# Patient Record
Sex: FEMALE | Race: OTHER | NOT HISPANIC OR LATINO | URBAN - METROPOLITAN AREA
[De-identification: names, ages, dates, MRNs, and addresses within clinical notes are randomized per-mention and may not be internally consistent; named-entity substitution may affect disease eponyms.]

---

## 2018-03-14 ENCOUNTER — EMERGENCY (EMERGENCY)
Facility: HOSPITAL | Age: 38
LOS: 1 days | Discharge: HOME | End: 2018-03-14
Attending: EMERGENCY MEDICINE
Payer: MEDICAID

## 2018-03-14 VITALS
SYSTOLIC BLOOD PRESSURE: 123 MMHG | TEMPERATURE: 98 F | OXYGEN SATURATION: 99 % | RESPIRATION RATE: 18 BRPM | HEART RATE: 71 BPM | DIASTOLIC BLOOD PRESSURE: 77 MMHG

## 2018-03-14 VITALS
HEART RATE: 69 BPM | DIASTOLIC BLOOD PRESSURE: 66 MMHG | RESPIRATION RATE: 19 BRPM | SYSTOLIC BLOOD PRESSURE: 124 MMHG | OXYGEN SATURATION: 99 %

## 2018-03-14 DIAGNOSIS — Z98.890 OTHER SPECIFIED POSTPROCEDURAL STATES: Chronic | ICD-10-CM

## 2018-03-14 PROCEDURE — 64400 NJX AA&/STRD TRIGEMINAL NRV: CPT | Mod: 26

## 2018-03-14 PROCEDURE — 99283 EMERGENCY DEPT VISIT LOW MDM: CPT | Mod: 25

## 2018-03-14 PROCEDURE — 64400 NJX AA&/STRD TRIGEMINAL NRV: CPT

## 2018-03-14 PROCEDURE — 99283 EMERGENCY DEPT VISIT LOW MDM: CPT

## 2018-03-14 RX ORDER — PENICILLIN V POTASSIUM 250 MG
500 TABLET ORAL ONCE
Qty: 0 | Refills: 0 | Status: COMPLETED | OUTPATIENT
Start: 2018-03-14 | End: 2018-03-14

## 2018-03-14 RX ORDER — PENICILLIN V POTASSIUM 250 MG
1 TABLET ORAL
Qty: 30 | Refills: 0 | OUTPATIENT
Start: 2018-03-14 | End: 2018-03-23

## 2018-03-14 RX ADMIN — Medication 500 MILLIGRAM(S): at 15:58

## 2018-03-14 NOTE — ED PROVIDER NOTE - ATTENDING CONTRIBUTION TO CARE
pt with dental pain  PE: Gen: WNL nontoxic, well apparing,  CV/LUNG: WNL  OP:  Dental tenderness bt tooth 3-4. possible early abscess vs open socket.    A/P;  dental pain.   meds/abx and tentative dc with close dental f/u.

## 2018-03-14 NOTE — ED ADULT NURSE NOTE - CAS TRG GEN SKIN COLOR
Hydrocodone- Acetaminophe 5-325      Last Written Prescription Date:  05/03/2017  Last Fill Quantity: 20,   # refills: 0  Last Office Visit with [facility] prescribing provider: 04/05/2017  Future Office visit:       Routing refill request to provider for review/approval because:  Drug not on the [facility] refill protocol or controlled substance    Kelly Burns MA 6/22/2017  3:27 PM           Normal for race

## 2018-03-14 NOTE — ED PROVIDER NOTE - OBJECTIVE STATEMENT
38 y/o F pt with PMHx of Gallstones (s/p cholecystectomy) and Uterine Fibroid (s/p myomectomy) and PSHx of Cholecystectomy, Appendectomy, and Myomectomy presents to ED c/o R-sided toothache x1 week. Pt states she was chewing gum when a filling in her R upper tooth fell out. Pt additionally reports fever for the past week. Per pt, pt was ill with the flu approximately x1 month ago. Pt states she has taken Motrin and Aleve with temporary improvement of sx's, but sx's ultimately return. Pt denies any other complaints. Pt also denies having visited a dentist for current tooth pain. NKDA. 38 y/o F pt with PMHx of Gallstones (s/p cholecystectomy) and Uterine Fibroid (s/p myomectomy) and PSHx of Cholecystectomy, Appendectomy, and Myomectomy presents to ED c/o R-sided toothache x1 week. Pt states she was chewing gum when a filling in her R upper tooth fell out. Per pt, pt was ill with the flu approximately x1 month ago. Pt states she has taken Motrin and Aleve with temporary improvement of sx's, but sx's ultimately return. Pt denies any other complaints. Pt also denies having visited a dentist for current tooth pain. NKDA.

## 2018-03-14 NOTE — ED PROCEDURE NOTE - CPROC ED POST PROC CARE GUIDE1
Instructed patient/caregiver to follow-up with primary dentist./Avoid hot food./Verbal/written post procedure instructions were given to patient/caregiver./Instructed patient/caregiver regarding signs and symptoms of infection./Avoid solid food.